# Patient Record
Sex: MALE | Race: WHITE | Employment: FULL TIME | URBAN - METROPOLITAN AREA
[De-identification: names, ages, dates, MRNs, and addresses within clinical notes are randomized per-mention and may not be internally consistent; named-entity substitution may affect disease eponyms.]

---

## 2019-05-25 ENCOUNTER — HOSPITAL ENCOUNTER (EMERGENCY)
Age: 39
Discharge: HOME | End: 2019-05-25
Attending: EMERGENCY MEDICINE
Payer: COMMERCIAL

## 2019-05-25 ENCOUNTER — APPOINTMENT (OUTPATIENT)
Dept: CT IMAGING | Age: 39
End: 2019-05-25
Payer: COMMERCIAL

## 2019-05-25 VITALS
WEIGHT: 315 LBS | SYSTOLIC BLOOD PRESSURE: 138 MMHG | HEIGHT: 67 IN | TEMPERATURE: 98.2 F | OXYGEN SATURATION: 96 % | BODY MASS INDEX: 49.44 KG/M2 | RESPIRATION RATE: 16 BRPM | DIASTOLIC BLOOD PRESSURE: 64 MMHG | HEART RATE: 81 BPM

## 2019-05-25 DIAGNOSIS — R31.0 HEMATURIA, GROSS: Primary | ICD-10-CM

## 2019-05-25 LAB
BILIRUBIN URINE: NEGATIVE
BLOOD, URINE: ABNORMAL
CLARITY: CLEAR
COLOR: YELLOW
EPITHELIAL CELLS, UA: 1 /HPF (ref 0–5)
GLUCOSE URINE: NEGATIVE MG/DL
HYALINE CASTS: 0 /LPF (ref 0–8)
KETONES, URINE: NEGATIVE MG/DL
LEUKOCYTE ESTERASE, URINE: NEGATIVE
MICROSCOPIC EXAMINATION: YES
NITRITE, URINE: NEGATIVE
PH UA: 6 (ref 5–8)
PROTEIN UA: NEGATIVE MG/DL
RBC UA: 7 /HPF (ref 0–4)
SPECIFIC GRAVITY UA: 1.02 (ref 1–1.03)
URINE TYPE: ABNORMAL
UROBILINOGEN, URINE: 1 E.U./DL
WBC UA: 5 /HPF (ref 0–5)

## 2019-05-25 PROCEDURE — 99283 EMERGENCY DEPT VISIT LOW MDM: CPT

## 2019-05-25 PROCEDURE — 81001 URINALYSIS AUTO W/SCOPE: CPT

## 2019-05-25 PROCEDURE — 74176 CT ABD & PELVIS W/O CONTRAST: CPT

## 2019-05-25 RX ORDER — LANOLIN ALCOHOL/MO/W.PET/CERES
1000 CREAM (GRAM) TOPICAL DAILY
COMMUNITY

## 2019-05-25 RX ORDER — RANITIDINE 150 MG/1
150 TABLET ORAL DAILY
COMMUNITY

## 2019-05-25 ASSESSMENT — PAIN SCALES - GENERAL: PAINLEVEL_OUTOF10: 4

## 2019-05-26 NOTE — ED PROVIDER NOTES
[facility]      Pt Name: Michael Lockhart  MRN: [id number]  [place] 1980  Date of evaluation: 5/25/2019  Provider: Su Jimenez MD  CHIEF COMPLAINT  Chief Complaint   Patient presents with    Hematuria     pt reports that he went to urinate this afternoon and he felt a pinch and then noticed some blood in urine. pt reports it only happened once. he adds he is being seen outpatient for follow up on some bleeding issues ( nose bleeds, blood in stool) but both have resolved colonoscopy next friday. he does report some tenderness in lower back. HPI  Michael Lockhart is a 41 y.o. male who presents because of a problem with voiding. He says that he urinated blood this afternoon after very harsh coughing. He felt a strong twinge of pain when he urinated. He has never had his problem before. He does report that he's had nosebleeds and rectal bleeding since the beginning of the year. He has seen his doctor and had blood work done that checked out fine. In fact, he just had blood work done yesterday. He is scheduled to have a colonoscopy and EGD in the near future. He is not having rectal bleeding today. He does have some left lower back pain. He denies any known history of kidney stones but was concerned that he might have one today. REVIEW OF SYSTEMS:  No fever, no change in bowel pattern, no vomiting, chronic nausea Pertinent positives and negatives as per the HPI. All other review of systems reviewed and negative. Nursing notes reviewed. PAST MEDICAL HISTORY  Past Medical History:   Diagnosis Date    Obese      SURGICAL HISTORY  Past Surgical History:   Procedure Laterality Date    KNEE ARTHROSCOPY       MEDICATIONS:  No current facility-administered medications on file prior to encounter.       Current Outpatient Medications on File Prior to Encounter   Medication Sig Dispense Refill    Cholecalciferol (VITAMIN D3) 5000 units TABS Take by mouth      vitamin B-12 (CYANOCOBALAMIN) 1000 MCG tablet Take 1,000 mcg by mouth daily      ranitidine (ZANTAC) 150 MG tablet Take 150 mg by mouth daily      Multiple Vitamins-Minerals (THERAPEUTIC MULTIVITAMIN-MINERALS) tablet Take 1 tablet by mouth daily      aspirin 81 MG tablet Take 81 mg by mouth daily       ALLERGIES  Clindamycin/lincomycin and Peanuts [peanut oil]  FAMILY HISTORY:  Not relevant  SOCIAL HISTORY  Social History     Tobacco Use    Smoking status: Never Smoker    Smokeless tobacco: Never Used   Substance Use Topics    Alcohol use: No    Drug use: No     IMMUNIZATIONS  Not relevant    PHYSICAL EXAM  VITAL SIGNS:  Blood pressure 138/64, pulse 81, temperature 98.2 °F (36.8 °C), resp. rate 16, height 5' 7\" (1.702 m), weight (!) 360 lb (163.3 kg), SpO2 96 %. Constitutional:  41 y.o. male alert, cooperative  HENT:  Atraumatic, mucous membranes moist  Eyes:   Conjunctiva clear, no discharge, no icterus  Neck:  Supple, no adenopathy  Cardiovascular:  Regular, no rubs, no discernible murmur  Thorax & Lungs:  No accessory muscle usage, clear  Abdomen:  Soft, non distended, bowel sounds present, no tenderness  Back:  No deformity, no CVA tenderness  Genitalia:  Deferred  Rectal:  Deferred  Extremities:  No cyanosis, no edema  Skin:  Warm, dry  Neurologic:  Alert, no slurred speech  Psychiatric:  Affect appropriate    DIAGNOSTIC RESULTS:  Labs resulted at the time of this note reviewed.   Labs Reviewed   URINALYSIS - Abnormal; Notable for the following components:       Result Value    Blood, Urine MODERATE (*)     All other components within normal limits    Narrative:     Performed at:  [facility]  [address] [facility], [address]   Phone [phone]   MICROSCOPIC URINALYSIS - Abnormal; Notable for the following components:    RBC, UA 7 (*)     All other components within normal limits    Narrative:     Performed at:  [facility]  [address] [facility] [place], [address]   Phone [phone]     RADIOLOGY:     CT ABDOMEN PELVIS WO CONTRAST Additional Contrast? None   Final Result   1. No acute infective or inflammatory process. 2. No urinary tract calculi. ED COURSE:  Uneventful     PROCEDURES:  None    CRITICAL CARE:  None    CONSULTATIONS:  None    MEDICAL DECISION MAKING: Vy Rios is a 41 y.o. male who presented because of a urinary problem with bleeding after coughing. He has no evidence of obstructing stone and no obvious bladder pathology. I will refer him to urology. Vy Rios was given appropriate discharge instructions. Referral to follow up provider. Differential Diagnosis:  cystitis, pyelonephritis, urinary retention, other    FOLLOW UP:    Shannon Bhagat MD  [address] [phone]    Schedule an appointment as soon as possible for a visit       [facility] Emergency Department  [address] [place] [address]  [phone]  Go to   If symptoms worsen    FINAL IMPRESSION:    1. Hematuria, gross      (Please note that I used voice recognition software to generate this note.   Occasionally words are mistranscribed despite my efforts to edit errors.)        Panchito Chacon MD  05/25/19 0858

## 2021-05-19 ENCOUNTER — HOSPITAL ENCOUNTER (EMERGENCY)
Age: 41
Discharge: HOME | End: 2021-05-20
Attending: EMERGENCY MEDICINE
Payer: COMMERCIAL

## 2021-05-19 VITALS
TEMPERATURE: 98.8 F | SYSTOLIC BLOOD PRESSURE: 123 MMHG | HEART RATE: 93 BPM | RESPIRATION RATE: 18 BRPM | OXYGEN SATURATION: 95 % | DIASTOLIC BLOOD PRESSURE: 73 MMHG

## 2021-05-19 DIAGNOSIS — R04.0 EPISTAXIS: Primary | ICD-10-CM

## 2021-05-19 RX ORDER — OXYMETAZOLINE HYDROCHLORIDE 0.05 G/100ML
2 SPRAY NASAL ONCE
Status: DISCONTINUED | OUTPATIENT
Start: 2021-05-19 | End: 2021-05-20 | Stop reason: HOSPADM

## 2021-05-20 PROCEDURE — 99283 EMERGENCY DEPT VISIT LOW MDM: CPT

## 2021-05-20 ASSESSMENT — ENCOUNTER SYMPTOMS
RESPIRATORY NEGATIVE: 1
EYES NEGATIVE: 1
GASTROINTESTINAL NEGATIVE: 1

## 2021-05-20 NOTE — ED TRIAGE NOTES
Patient arrives c/o a nosebleed that started about 1 hour ago. Patient states that he has had frequent nosebleeds recently, especially while at work. Patient states that he used to get nosebleeds a lot as a kid, but it got better.

## 2021-05-20 NOTE — ED PROVIDER NOTES
[address] [facility] ENCOUNTER          ATTENDING PHYSICIAN NOTE       Date of evaluation: 5/19/2021    Chief Complaint     Epistaxis      History of Present Illness     Anabel Goddard is a 47 y.o. male who presents to the emergency department complaining of nosebleeds. Patient states he has been having intermittent nosebleeds over the last several weeks. He states tonight his bleeding was much worse while he was at work to the point where he had to stuff a paper towel of his nose. He denies any trauma. He denies any fevers or chills. He states he has had issues with easy bleeding in the past but normal diagnostic testing. He states he does take a baby aspirin daily. On arrival, the patient has no active bleeding. Review of Systems     Review of Systems   Constitutional: Negative. HENT: Positive for nosebleeds. Eyes: Negative. Respiratory: Negative. Cardiovascular: Negative. Gastrointestinal: Negative. Genitourinary: Negative. Musculoskeletal: Negative. Neurological: Negative. All other systems reviewed and are negative. Past Medical, Surgical, Family, and Social History     He has a past medical history of Obese. He has a past surgical history that includes Knee arthroscopy. His family history is not on file. He reports that he has never smoked. He has never used smokeless tobacco. He reports that he does not drink alcohol and does not use drugs.     Medications     Discharge Medication List as of 5/20/2021  1:47 AM      CONTINUE these medications which have NOT CHANGED    Details   Cholecalciferol (VITAMIN D3) 5000 units TABS Take by mouthHistorical Med      vitamin B-12 (CYANOCOBALAMIN) 1000 MCG tablet Take 1,000 mcg by mouth dailyHistorical Med      ranitidine (ZANTAC) 150 MG tablet Take 150 mg by mouth dailyHistorical Med      Multiple Vitamins-Minerals (THERAPEUTIC MULTIVITAMIN-MINERALS) tablet Take 1 tablet by mouth daily             Allergies

## 2023-10-01 ENCOUNTER — OFFICE VISIT (OUTPATIENT)
Age: 43
End: 2023-10-01

## 2023-10-01 VITALS
HEIGHT: 67 IN | OXYGEN SATURATION: 93 % | DIASTOLIC BLOOD PRESSURE: 88 MMHG | BODY MASS INDEX: 49.44 KG/M2 | HEART RATE: 71 BPM | SYSTOLIC BLOOD PRESSURE: 142 MMHG | WEIGHT: 315 LBS | TEMPERATURE: 97.8 F

## 2023-10-01 DIAGNOSIS — L30.9 DERMATITIS DUE TO UNKNOWN CAUSE: Primary | ICD-10-CM

## 2023-10-01 RX ORDER — METHYLPREDNISOLONE 4 MG/1
TABLET ORAL
Qty: 21 TABLET | Refills: 0 | Status: SHIPPED | OUTPATIENT
Start: 2023-10-01 | End: 2023-10-07

## 2023-10-01 ASSESSMENT — ENCOUNTER SYMPTOMS
RESPIRATORY NEGATIVE: 1
GASTROINTESTINAL NEGATIVE: 1

## 2024-07-25 ENCOUNTER — OFFICE VISIT (OUTPATIENT)
Age: 44
End: 2024-07-25

## 2024-07-25 VITALS
WEIGHT: 315 LBS | HEIGHT: 67 IN | TEMPERATURE: 98.4 F | DIASTOLIC BLOOD PRESSURE: 73 MMHG | OXYGEN SATURATION: 98 % | SYSTOLIC BLOOD PRESSURE: 107 MMHG | RESPIRATION RATE: 18 BRPM | HEART RATE: 80 BPM | BODY MASS INDEX: 49.44 KG/M2

## 2024-07-25 DIAGNOSIS — U07.1 COVID-19: Primary | ICD-10-CM

## 2024-07-25 LAB
Lab: ABNORMAL
PERFORMING INSTRUMENT: ABNORMAL
QC PASS/FAIL: ABNORMAL
SARS-COV-2, POC: DETECTED
STREPTOCOCCUS A RNA: NORMAL

## 2024-07-25 RX ORDER — ALBUTEROL SULFATE 90 UG/1
2 AEROSOL, METERED RESPIRATORY (INHALATION) 4 TIMES DAILY PRN
Qty: 18 G | Refills: 0 | Status: SHIPPED | OUTPATIENT
Start: 2024-07-25

## 2024-07-25 RX ORDER — FAMOTIDINE 40 MG/1
40 TABLET, FILM COATED ORAL DAILY
COMMUNITY
Start: 2023-05-12

## 2024-07-25 RX ORDER — HYDROXYZINE HYDROCHLORIDE 25 MG/1
25 TABLET, FILM COATED ORAL 3 TIMES DAILY PRN
COMMUNITY

## 2024-07-25 RX ORDER — ATORVASTATIN CALCIUM 40 MG/1
40 TABLET, FILM COATED ORAL DAILY
COMMUNITY
Start: 2023-05-12

## 2024-07-25 RX ORDER — ESCITALOPRAM OXALATE 20 MG/1
20 TABLET ORAL DAILY
COMMUNITY
Start: 2023-05-12

## 2024-07-25 ASSESSMENT — ENCOUNTER SYMPTOMS
WHEEZING: 0
SHORTNESS OF BREATH: 1
SORE THROAT: 1
ABDOMINAL PAIN: 0
VOMITING: 0
COUGH: 1
DIARRHEA: 0
NAUSEA: 0

## 2024-07-25 NOTE — PATIENT INSTRUCTIONS
COVID test is positive today.  Rapid Strep is NEG.  Conservative care as discussed and isolation until asymptomatic or 5 days.  Read printed documents provided.  Prescription for Albuterol inhaler due to history of intermittent asthma.  Wear mask days 5-10 if asymptomatic.  Continue to rest, hydrate and treat most troublesome symptoms with OTC as needed.  Seek medical attention for worsening symptoms such as shortness of breath or other concerns.